# Patient Record
(demographics unavailable — no encounter records)

---

## 2025-01-13 NOTE — HISTORY OF PRESENT ILLNESS
[FreeTextEntry1] : follow up  [de-identified] : 18 years old female with iron deficiency anemia , hypercholesterolemia presents for six months follow up, she states feeling well, taking daily ferrous sulfate supplement; due for labs to monitor iron and CBC, lipids, offers no complaints

## 2025-05-02 NOTE — DISCUSSION/SUMMARY
[FreeTextEntry1] : 18 y/o  (FT NSVDD) LMP 25 EDC 25 at 10wks 6days positive pregnancy test +SIUP PNV, NT OB US F/u 1-2 weeks for New OB visit,  NIPS , labs PHQ-9 Patient screened for depression - no signs of clinical depression. PHQ-9 scores reviewed over the course of the visit 5-10minutes of fac to face time. Follow up with changes in mood including other symptoms of anxiety. screen 6

## 2025-05-02 NOTE — HISTORY OF PRESENT ILLNESS
[FreeTextEntry1] : 18 y/o  (FT NSVDD) LMP 25 EDC 25 at 10wks 6days positive pregnancy test  no bleeding  no iusses during first pregnancy  finished high school  PNV

## 2025-07-08 NOTE — HEALTH RISK ASSESSMENT
[Good] : ~his/her~  mood as  good [No] : No [No falls in past year] : Patient reported no falls in the past year [0] : 2) Feeling down, depressed, or hopeless: Not at all (0) [PHQ-2 Negative - No further assessment needed] : PHQ-2 Negative - No further assessment needed [Yes] : Reviewed medication list for presence of high-risk medications. [Never] : Never [NO] : No [HIV test declined] : HIV test declined [Hepatitis C test declined] : Hepatitis C test declined [With Significant Other] : lives with significant other [Unemployed] : unemployed [High School] : high school [Significant Other] : lives with significant other [# Of Children ___] : has [unfilled] children [Sexually Active] : sexually active [Feels Safe at Home] : Feels safe at home [Fully functional (bathing, dressing, toileting, transferring, walking, feeding)] : Fully functional (bathing, dressing, toileting, transferring, walking, feeding) [Fully functional (using the telephone, shopping, preparing meals, housekeeping, doing laundry, using] : Fully functional and needs no help or supervision to perform IADLs (using the telephone, shopping, preparing meals, housekeeping, doing laundry, using transportation, managing medications and managing finances) [Smoke Detector] : smoke detector [Carbon Monoxide Detector] : carbon monoxide detector [Seat Belt] :  uses seat belt [[id number]] : 0 [Change in mental status noted] : No change in mental status noted [Reports changes in hearing] : Reports no changes in hearing [Reports changes in vision] : Reports no changes in vision [Reports changes in dental health] : Reports no changes in dental health

## 2025-07-08 NOTE — HISTORY OF PRESENT ILLNESS
[de-identified] : 19 years old female presents for annual physical exam, she is 20 weeks pregnant, followed by GYN; offers no complaints

## 2025-07-08 NOTE — PLAN
[FreeTextEntry1] : 1. annual physical exam * routine general labs done * age appropriate health maintenance recommendations reviewed, discussed including healthy diet, regular exercise 2. iron deficiency anemia * lab for iron studies 3. pregnancy 20 weeks * f/u with GYN for prenatal care * c/w prenatal vitamins * f/u in six months